# Patient Record
Sex: MALE | Race: WHITE
[De-identification: names, ages, dates, MRNs, and addresses within clinical notes are randomized per-mention and may not be internally consistent; named-entity substitution may affect disease eponyms.]

---

## 2018-03-05 ENCOUNTER — HOSPITAL ENCOUNTER (EMERGENCY)
Dept: HOSPITAL 45 - C.EDB | Age: 58
Discharge: HOME | End: 2018-03-05
Payer: COMMERCIAL

## 2018-03-05 VITALS
HEIGHT: 75 IN | BODY MASS INDEX: 39.17 KG/M2 | BODY MASS INDEX: 39.17 KG/M2 | WEIGHT: 315 LBS | HEIGHT: 75 IN | WEIGHT: 315 LBS

## 2018-03-05 VITALS — HEART RATE: 63 BPM | DIASTOLIC BLOOD PRESSURE: 90 MMHG | SYSTOLIC BLOOD PRESSURE: 170 MMHG | OXYGEN SATURATION: 96 %

## 2018-03-05 VITALS — TEMPERATURE: 98.42 F

## 2018-03-05 DIAGNOSIS — I10: ICD-10-CM

## 2018-03-05 DIAGNOSIS — J45.909: ICD-10-CM

## 2018-03-05 DIAGNOSIS — S30.0XXA: ICD-10-CM

## 2018-03-05 DIAGNOSIS — S22.32XA: Primary | ICD-10-CM
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
## 2018-03-05 LAB
ALBUMIN SERPL-MCNC: 4 GM/DL (ref 3.4–5)
ALP SERPL-CCNC: 79 U/L (ref 45–117)
ALT SERPL-CCNC: 33 U/L (ref 12–78)
AST SERPL-CCNC: 18 U/L (ref 15–37)
BASOPHILS # BLD: 0.06 K/UL (ref 0–0.2)
BASOPHILS NFR BLD: 0.5 %
BUN SERPL-MCNC: 17 MG/DL (ref 7–18)
CALCIUM SERPL-MCNC: 8.7 MG/DL (ref 8.5–10.1)
CO2 SERPL-SCNC: 24 MMOL/L (ref 21–32)
CREAT SERPL-MCNC: 0.79 MG/DL (ref 0.6–1.4)
EOS ABS #: 0.04 K/UL (ref 0–0.5)
EOSINOPHIL NFR BLD AUTO: 217 K/UL (ref 130–400)
GLUCOSE SERPL-MCNC: 118 MG/DL (ref 70–99)
HCT VFR BLD CALC: 43.5 % (ref 42–52)
HGB BLD-MCNC: 15.2 G/DL (ref 14–18)
IG#: 0.07 K/UL (ref 0–0.02)
IMM GRANULOCYTES NFR BLD AUTO: 12.6 %
LYMPHOCYTES # BLD: 1.38 K/UL (ref 1.2–3.4)
MCH RBC QN AUTO: 31.5 PG (ref 25–34)
MCHC RBC AUTO-ENTMCNC: 34.9 G/DL (ref 32–36)
MCV RBC AUTO: 90.1 FL (ref 80–100)
MONO ABS #: 0.62 K/UL (ref 0.11–0.59)
MONOCYTES NFR BLD: 5.7 %
NEUT ABS #: 8.77 K/UL (ref 1.4–6.5)
NEUTROPHILS # BLD AUTO: 0.4 %
NEUTROPHILS NFR BLD AUTO: 80.2 %
PMV BLD AUTO: 9.4 FL (ref 7.4–10.4)
POTASSIUM SERPL-SCNC: 3.7 MMOL/L (ref 3.5–5.1)
PROT SERPL-MCNC: 7.5 GM/DL (ref 6.4–8.2)
RED CELL DISTRIBUTION WIDTH CV: 13.2 % (ref 11.5–14.5)
RED CELL DISTRIBUTION WIDTH SD: 43.4 FL (ref 36.4–46.3)
SODIUM SERPL-SCNC: 135 MMOL/L (ref 136–145)
WBC # BLD AUTO: 10.94 K/UL (ref 4.8–10.8)

## 2018-03-05 NOTE — EMERGENCY ROOM VISIT NOTE
History


First contact with patient:  16:26


Chief Complaint:  FALL


Stated Complaint:  FALL, BACK PAIN -WORKERS COMP





History of Present Illness


The patient is a 58 year old male who presents to the Emergency Room via ALS 

with complaints of severe back pain s/p fall.  At approximately 10:00 this 

morning, the patient was attempting to get out of his truck.  He states he 

missed a step, and fell backwards, attempting to turn toward the side, but 

unsuccessfully.  He states when he landed, he landed flat on his back in a 

supine position.  He does admit to striking his head and losing consciousness.  

The patient states when he awoke, he was feeling very disoriented and having 

some difficulty breathing.  He attempted to get back in his truck without 

success.  His Worker's Compensation providers recommended he go to urgent care 

to be evaluated.  He contacted urgent care, and was advised to come straight to 

the emergency department due to his injuries.  The patient is unable to sit or 

lie down due to severe pain in his mid back.  He denies any paresthesias or 

weakness.  He was given 15 mg total morphine prior to arrival with mild 

improvement in pain.  He does describe some spasms throughout his mid to low 

back, and states when this occurs, it is very difficult for him to breathe.  He 

denies any cough or coughing up sputum or blood.  He denies any significant 

chest pain.  He does have tenderness of the neck, which shoots pain down into 

his lower back.  He denies any confusion, dizziness, visual disturbances, or 

balance abnormalities.  The patient denies previous injury to his back, but 

does have a history of degenerative joint disease, and states "my bottom disc 

is crushed".





Review of Systems


A complete 10 point review of systems was reviewed with the patient with 

pertinent positives and negatives as per history of present illness. All else 

were negative.





Past Medical/Surgical History


Medical Problems:


(1) Asthma


(2) HTN (hypertension)


(3) PNA (pneumonia)








Family History





Patient reports no known family medical history.





Social History


Smoking Status:  Never Smoker


Alcohol Use:  occasionally


Marital Status:  single


Housing Status:  lives alone


Occupation Status:  employed





Current/Historical Medications


Scheduled


Epinephrine (Epipen), 0.3 MG IM UD


Hydrochlorothiazide (Hctz), 12.5 MG PO DAILY


Mometasone Furoate (Inhalation (Asmanex Hfa), 2 PUFFS PO BID





Scheduled PRN


Albuterol Inhaler (Ventolin Inhaler), 2 PUFFS INH QID PRN for Asthma


Baclofen (Lioresal), 10 MG PO TID PRN for Muscle Spasms


Lidocaine (Lidocaine), 1 PATCH TD QD PRN for Pain


Oxycodone Ir (Roxicodone Ir), 1-2 TAB PO Q4H PRN for Pain





Physical Exam


Vital Signs











  Date Time  Temp Pulse Resp B/P (MAP) Pulse Ox O2 Delivery O2 Flow Rate FiO2


 


3/5/18 20:39  63 18 170/90 96   


 


3/5/18 18:22  67 18 151/88 94 Room Air  


 


3/5/18 16:33 36.9 78 18 199/118 96 Room Air  











Physical Exam


VITALS: Vitals are noted on the nurse's note and reviewed by myself.  Vital 

signs stable.


GENERAL: This is a 58-year-old obese white male, in no acute distress, 

nondiaphoretic, well-developed well-nourished.


SKIN: The skin was without rashes, erythema, edema, or bruising.  There is no 

tenting of the skin.  Capillary reflex less than 2 seconds.


HEAD: Normocephalic atraumatic.  


EARS: External auditory canals clear, tympanic membranes pearly gray without 

erythema or effusion bilaterally.


EYES: Pupils equal round and reactive to light and accommodation.  Conjunctivae 

without injection, sclerae without icterus.  Extraocular movements intact.  


NOSE: Patent, turbinates without inflammation or discharge.  No sinus 

tenderness.


MOUTH: Mucous membranes moist.  Tonsils are not enlarged.  Pharynx without 

erythema or exudate.  Uvula midline.  Airway patent.  Tongue does not deviate.  


NECK: Supple without nuchal rigidity.  No lymphadenopathy.  No thyromegaly.  

Cervical spine is nontender.  No JVD.


HEART: Regular rate and rhythm without murmurs gallops or rubs.


LUNGS: Clear to auscultation bilaterally without wheezes, rales or rhonchi.  No 

dullness to percussion.  No retractions or accessory muscle use.


ABDOMEN: Positive bowel sounds x 4.  Normal tympanic percussion.  Soft, 

nontender, without masses or organomegaly.


MUSCULOSKELETAL: No muscle atrophy, erythema, or edema noted.  Full range of 

motion without joint tenderness in all extremities.  Tenderness of the mid-low 

spinous processes and right paraspinous muscles in the lumbar region on 

palpation.  Slow gait.  The patient is having difficulty with any attempts to 

lie flat or lift his feet. Unable to perform straight-leg raise testing. 

Strength 5/5 throughout.


NEURO: Patient was alert and oriented to person place and time.  Normal 

sensation to light and sharp touch.  Deep tendon reflexes 2+ throughout.  No 

focal neurological deficits.





Medical Decision & Procedures


ER Provider


Diagnostic Interpretation:


HEAD WITHOUT CONTRAST (CT)





CT DOSE: 5214.04 mGy.cm





HISTORY: Trauma  head injury, 3.5 ft fall with LOC





TECHNIQUE: Multiaxial CT images of the head were performed without the use of


intravenous contrast.  A dose lowering technique was utilized adhering to the


principles of ALARA.








Comparison: None.





Findings: The paranasal sinuses and mastoid air cells are clear. The calvarium


and skull base are intact. The ventricles and sulci are within normal limits.


There is no mass, hematoma, midline shift, or acute infarct.





Impression:


No acute intracranial abnormality. 

















The above report was generated using voice recognition software.  It may contain


grammatical, syntax or spelling errors.











Electronically signed by:  Will Russ M.D.


3/5/2018 6:50 PM





Dictated Date/Time:  3/5/2018 6:49 PM








CERVICAL SPINE W/O








CT DOSE:    





HISTORY: Trauma  head injury, neck pain, 3.5 ft. fall





TECHNIQUE: Multiaxial CT images of the cervical spine were performed and


reformatted in the sagittal and coronal plane without the use of contrast.  A


dose lowering technique was utilized adhering to the principles of ALARA.





COMPARISON:  None.





FINDINGS: No fractures. No subluxation. Prevertebral soft tissues and the C1-C2


interval are intact. No pneumothorax.





IMPRESSION:  


No fractures within the cervical spine. Moderate degenerative change.











The above report was generated using voice recognition software.  It may contain


grammatical, syntax or spelling errors.











Electronically signed by:  Wlil Russ M.D.


3/5/2018 6:56 PM





Dictated Date/Time:  3/5/2018 6:51 PM








(CHEST) THORAX WITH





CT DOSE:    





HISTORY: Trauma  trauma, thoracic/lumbar back pain, dyspnea





TECHNIQUE: Multiaxial CT images of the chest were performed following the


intravenous administration of contrast.  A dose lowering technique was utilized


adhering to the principles of ALARA.








COMPARISON:  None.





FINDINGS: The lungs are clear. The mediastinal vascular structures are within


normal limits. No mediastinal or hilar lymphadenopathy. No pleural effusion or


pneumothorax. Limited views of the upper abdomen demonstrate a normal liver and


spleen. Nondisplaced cortical fracture posterior left sixth rib. No evidence for


compression deformity.





IMPRESSION: 


No significant abnormality identified within the chest. Nondisplaced cortical


fracture left posterior sixth rib. Moderate degenerative change throughout the


thoracic region with no evidence for compression deformity.














The above report was generated using voice recognition software.  It may contain


grammatical, syntax or spelling errors.











Electronically signed by:  Will Russ M.D.


3/5/2018 7:02 PM





Dictated Date/Time:  3/5/2018 6:58 PM








ABD/PELVIS IV CONTRAST ONLY





CT DOSE:    





HISTORY: Trauma. Pain.  trauma, thoracic/lumbar back pain





TECHNIQUE: Multiaxial CT images of the abdomen and pelvis were performed


following the use of intravenous contrast.  A dose lowering technique was


utilized adhering to the principles of ALARA.








COMPARISON STUDY: None.





FINDINGS: The lung bases are clear. The liver, spleen, gallbladder, pancreas,


kidneys, and adrenal glands are within normal limits. No bowel wall thickening


or obstruction. The pelvic organs are unremarkable. No suspicious lytic or


blastic osseous lesions.





IMPRESSION: 


No significant abnormality identified within the abdomen or pelvis. Moderate


degenerative changes of the low lumbar region with no acute bony abnormality


identified.











The above report was generated using voice recognition software.  It may contain


grammatical, syntax or spelling errors.











Electronically signed by:  Will Russ M.D.


3/5/2018 7:06 PM





Dictated Date/Time:  3/5/2018 7:04 PM





Laboratory Results


3/5/18 17:34








Red Blood Count 4.83, Mean Corpuscular Volume 90.1, Mean Corpuscular Hemoglobin 

31.5, Mean Corpuscular Hemoglobin Concent 34.9, Mean Platelet Volume 9.4, 

Neutrophils (%) (Auto) 80.2, Lymphocytes (%) (Auto) 12.6, Monocytes (%) (Auto) 

5.7, Eosinophils (%) (Auto) 0.4, Basophils (%) (Auto) 0.5, Neutrophils # (Auto) 

8.77, Lymphocytes # (Auto) 1.38, Monocytes # (Auto) 0.62, Eosinophils # (Auto) 

0.04, Basophils # (Auto) 0.06





3/5/18 17:34

















Test


  3/5/18


17:34


 


White Blood Count


  10.94 K/uL


(4.8-10.8)


 


Red Blood Count


  4.83 M/uL


(4.7-6.1)


 


Hemoglobin


  15.2 g/dL


(14.0-18.0)


 


Hematocrit 43.5 % (42-52) 


 


Mean Corpuscular Volume


  90.1 fL


()


 


Mean Corpuscular Hemoglobin


  31.5 pg


(25-34)


 


Mean Corpuscular Hemoglobin


Concent 34.9 g/dl


(32-36)


 


Platelet Count


  217 K/uL


(130-400)


 


Mean Platelet Volume


  9.4 fL


(7.4-10.4)


 


Neutrophils (%) (Auto) 80.2 % 


 


Lymphocytes (%) (Auto) 12.6 % 


 


Monocytes (%) (Auto) 5.7 % 


 


Eosinophils (%) (Auto) 0.4 % 


 


Basophils (%) (Auto) 0.5 % 


 


Neutrophils # (Auto)


  8.77 K/uL


(1.4-6.5)


 


Lymphocytes # (Auto)


  1.38 K/uL


(1.2-3.4)


 


Monocytes # (Auto)


  0.62 K/uL


(0.11-0.59)


 


Eosinophils # (Auto)


  0.04 K/uL


(0-0.5)


 


Basophils # (Auto)


  0.06 K/uL


(0-0.2)


 


RDW Standard Deviation


  43.4 fL


(36.4-46.3)


 


RDW Coefficient of Variation


  13.2 %


(11.5-14.5)


 


Immature Granulocyte % (Auto) 0.6 % 


 


Immature Granulocyte # (Auto)


  0.07 K/uL


(0.00-0.02)


 


Anion Gap


  8.0 mmol/L


(3-11)


 


Est Creatinine Clear Calc


Drug Dose 157.0 ml/min 


 


 


Estimated GFR (


American) 113.1 


 


 


Estimated GFR (Non-


American 97.6 


 


 


BUN/Creatinine Ratio 21.9 (10-20) 


 


Calcium Level


  8.7 mg/dl


(8.5-10.1)


 


Total Bilirubin


  1.6 mg/dl


(0.2-1)


 


Aspartate Amino Transf


(AST/SGOT) 18 U/L (15-37) 


 


 


Alanine Aminotransferase


(ALT/SGPT) 33 U/L (12-78) 


 


 


Alkaline Phosphatase


  79 U/L


()


 


Total Protein


  7.5 gm/dl


(6.4-8.2)


 


Albumin


  4.0 gm/dl


(3.4-5.0)


 


Globulin


  3.5 gm/dl


(2.5-4.0)


 


Albumin/Globulin Ratio 1.1 (0.9-2) 











Medications Administered











 Medications


  (Trade)  Dose


 Ordered  Sig/Patt


 Route  Start Time


 Stop Time Status Last Admin


Dose Admin


 


 Hydromorphone HCl


  (Dilaudid Inj)  1 mg  NOW  STAT


 IV  3/5/18 16:49


 3/5/18 16:50 DC 3/5/18 18:30


1 MG


 


 Ondansetron HCl


  (Zofran Inj)  4 mg  NOW  STAT


 IV  3/5/18 16:49


 3/5/18 16:50 DC 3/5/18 18:18


4 MG


 


 Ondansetron HCl


  (Zofran Inj)  4 mg  NOW  STAT


 IV  3/5/18 19:03


 3/5/18 19:04 DC 3/5/18 19:03


4 MG


 


 Diazepam


  (Valium Inj)  5 mg  NOW  STAT


 IV  3/5/18 19:20


 3/5/18 19:22 DC 3/5/18 20:05


5 MG


 


 Lidocaine


  (Lidoderm Patch


 5%)  1 patch  NOW  STAT


 TD  3/5/18 20:05


 3/5/18 20:06 DC 3/5/18 20:28


1 PATCH


 


 Oxycodone HCl


  (Roxicodone


 Immediate Rel 5MG


 Home Pack)  1 homepack  UD  STAT


 PO  3/5/18 20:09


 3/5/18 20:10 DC 3/5/18 20:28


1 HOMEPACK


 


 Ondansetron HCl


  (Zofran Inj)  4 mg  NOW  STAT


 IV  3/5/18 20:09


 3/5/18 20:10 DC 3/5/18 20:28


4 MG


 


 Ondansetron HCl


  (ZOFRAN ODT 4MG


 Home Pack)  1 homepack  UD  STAT


 PO  3/5/18 20:09


 3/5/18 20:10 DC 3/5/18 20:28


1 HOMEPACK











ED Course


The patient was seen and evaluated as above.





Labs obtained.  CT scans ordered.  Order for Zofran and Dilaudid placed.  The 

patient would like to wait for CT scan to get the Dilaudid.





The patient was given Dilaudid and CT scans performed.





The patient complains of increased nausea.  He was given 4 mg Zofran IV.





The patient was reassessed.  I did discuss the findings with the patient at 

bedside.  He states he does not feel that he can go home safely due to 

significant pain and inability to get into a vehicle.  I discussed with him 

that recommendation would be for discharge with pain medication, muscle relaxers

, and close follow-up with the primary care provider.  The patient does request 

discussing his case with the admitting team.





I discussed the case with the [facility] hospitalist.  They recommended 

discharge with pain medication, as they would not be willing to medicate the 

patient with narcotics all night.





I discussed this recommendation with the patient at bedside.  He is agreeable.  

He is given 5 mg Valium and a repeat dose of 4 mg Zofran 4 nausea and muscle 

spasms.





Discharge instructions reviewed, the patient was discharged home in good 

condition.





Medical Decision


This is a 58-year-old male who presents to the emergency department via ALS 

approximately 6 hours after 3-1/2 foot fall landing on his back with head 

injury and loss of consciousness.  The patient is having severe right-sided 

back pain since the fall.  He was given 50 mg morphine prior to arrival.  Labs 

were reviewed and non-concerning.  Imaging performed and significant for only 

left posterior nondisplaced sixth rib fracture.  The patient was given 1 mg 

Dilaudid and several doses of Zofran while here in the emergency department.  

The muscle spasms seem to be more concerning for the patient, and he was given 

Valium to help.  I did discuss the case with the hospitalist at the patient's 

request due to intractable pain.  The hospitalist recommendation was for a 

lidocaine patch and anti-inflammatory medications with muscle relaxers.  I 

discussed this with patient at bedside.  He was agreeable to this treatment 

plan.  I discussed proper outpatient management and encouraged him to follow-up 

closely with his primary care provider and/or Worker's Compensation provider.  

All questions were answered to patient's satisfaction.





Etiologies such as fracture, dislocation, soft tissue injury, intra-abdominal, 

intrathoracic, intracranial as well as other traumatic pathologies were 

entertained.





Medication Reconcilliation


Current Medication List:  was personally reviewed by me





Blood Pressure Screening


Patient's blood pressure:  Elevated blood pressure


Blood pressure disposition:  Elevated BP felt to be situational





Impression





 Primary Impression:  


 Left rib fracture


 Additional Impressions:  


 Contusion of lower back


 Fall





Departure Information


Dispostion


Home / Self-Care





Condition


GOOD





Prescriptions





Lidocaine (LIDOCAINE) 5 % Pad


1 PATCH TD QD Y for Pain, #1 BOX


   Apply for 12 hours, then remove for 12 hours. Max 1 patch in 12 hours.


   Prov: Jewels Keane PA-C         3/5/18 


Oxycodone Ir (Roxicodone Ir) 5 Mg Tab


1-2 TAB PO Q4H Y for Pain, #15 TAB


   For Initial Treatment


   Prov: Jewels Keane PA-C         3/5/18 


Baclofen (Lioresal) 10 Mg Tab


10 MG PO TID Y for Muscle Spasms, #15 TAB


   Prov: Jewels Keane PA-C         3/5/18





Referrals


No Doctor, Assigned (PCP)





Patient Instructions


ED Fx Rib, ED Low Back Pain Injury, ED Mechanical Fall, [facility]





Additional Instructions





You have been treated in the Emergency Department for posterior left sixth rib 

fracture.  You have received pain medicine in the emergency department which 

impairs your ability to operate a vehicle. It is illegal for you to drive after 

receiving these medicines. 





You have been prescribed OxyIR to be used for pain control. This is a narcotic 

medication. You cannot drive or consume alcohol while on this medicine. This 

medicine should only be used for pain that cannot be controlled with over-the-

counter pain medicines.





You have been prescribed baclofen 1 tabs orally, three times per day. Do NOT 

exceed 30 mg (3 tabs) per day. Take your first dose at bedtime as it can make 

you drowsy. Always take all medications as prescribed.





You have been prescribed Lidoderm patches.  Use these patches as directed.  He 

may use only 1 patch per 24 hours, with 12 hours in between patches.





For pain control, you can use the following over-the-counter medicines (if >13 yo):


Ibuprofen(Motrin, Advil) may be used for fever or pain.  Use 600mg every six 

hours as needed.  Take with food.  Avoid using more than 2400mg in a 24 hour 

period.  Do not use 2400mg per day for more than three consecutive days without 

physician direction.  Prolonged inappropriate use can lead to stomach upset or 

ulcers. 


(AND/OR)


Acetaminophen(Tylenol) may be used for fever or pain.  Use 1000mg every six 

hours as needed.  Avoid using more than 3000mg in a 24 hour period.  





If this is an acute injury, ice can be applied to the area of pain for the 

first 3 days to help decrease pain and inflammation. After the first 3 days, a 

heating pad can be used over the area for continued soothing relief.





To minimize your discomfort, you can hug a pillow while coughing or sneezing.





Additionally, you should continue to force yourself to take nice, deep breaths. 

Full expansion of the lungs is necessary to prevent the accumulation of fluid 

in the lung tissue and development of pneumonia.





You should schedule a follow-up appointment in 2-3 days with your Primary Care 

Provider for further evaluation and treatment of your back pain.





Return to the Emergency Department if your current symptoms worsen despite 

treatment course outlined above, or if you develop any of the following symptoms

: intractable pain despite aforementioned treatment course, development of a 

wet cough, bloody cough, fever, chills, or increased shortness of breath.





Work Instructions


Return To Work:  1 week





Problem Qualifiers








 Primary Impression:  


 Left rib fracture


 Encounter type:  initial encounter  Rib fracture type:  single rib  Fracture 

type:  closed  Qualified Codes:  S22.32XA - Fracture of one rib, left side, 

initial encounter for closed fracture


 Additional Impressions:  


 Contusion of lower back


 Encounter type:  initial encounter  Qualified Codes:  S30.0XXA - Contusion of 

lower back and pelvis, initial encounter


 Fall


 Encounter type:  initial encounter  Qualified Codes:  W19.XXXA - Unspecified 

fall, initial encounter

## 2018-03-05 NOTE — DIAGNOSTIC IMAGING REPORT
CERVICAL SPINE W/O





CT DOSE:    



HISTORY: Trauma  head injury, neck pain, 3.5 ft. fall



TECHNIQUE: Multiaxial CT images of the cervical spine were performed and

reformatted in the sagittal and coronal plane without the use of contrast.  A

dose lowering technique was utilized adhering to the principles of ALARA.



COMPARISON:  None.



FINDINGS: No fractures. No subluxation. Prevertebral soft tissues and the C1-C2

interval are intact. No pneumothorax.



IMPRESSION:  

No fractures within the cervical spine. Moderate degenerative change.







The above report was generated using voice recognition software.  It may contain

grammatical, syntax or spelling errors.







Electronically signed by:  Will Russ M.D.

3/5/2018 6:56 PM



Dictated Date/Time:  3/5/2018 6:51 PM

## 2018-03-05 NOTE — DIAGNOSTIC IMAGING REPORT
(CHEST) THORAX WITH



CT DOSE:    



HISTORY: Trauma  trauma, thoracic/lumbar back pain, dyspnea



TECHNIQUE: Multiaxial CT images of the chest were performed following the

intravenous administration of contrast.  A dose lowering technique was utilized

adhering to the principles of ALARA.





COMPARISON:  None.



FINDINGS: The lungs are clear. The mediastinal vascular structures are within

normal limits. No mediastinal or hilar lymphadenopathy. No pleural effusion or

pneumothorax. Limited views of the upper abdomen demonstrate a normal liver and

spleen. Nondisplaced cortical fracture posterior left sixth rib. No evidence for

compression deformity.



IMPRESSION: 

No significant abnormality identified within the chest. Nondisplaced cortical

fracture left posterior sixth rib. Moderate degenerative change throughout the

thoracic region with no evidence for compression deformity.









The above report was generated using voice recognition software.  It may contain

grammatical, syntax or spelling errors.







Electronically signed by:  Will Russ M.D.

3/5/2018 7:02 PM



Dictated Date/Time:  3/5/2018 6:58 PM

## 2018-03-05 NOTE — DIAGNOSTIC IMAGING REPORT
HEAD WITHOUT CONTRAST (CT)



CT DOSE: 5214.04 mGy.cm



HISTORY: Trauma  head injury, 3.5 ft fall with LOC



TECHNIQUE: Multiaxial CT images of the head were performed without the use of

intravenous contrast.  A dose lowering technique was utilized adhering to the

principles of ALARA.





Comparison: None.



Findings: The paranasal sinuses and mastoid air cells are clear. The calvarium

and skull base are intact. The ventricles and sulci are within normal limits.

There is no mass, hematoma, midline shift, or acute infarct.



Impression:

No acute intracranial abnormality. 











The above report was generated using voice recognition software.  It may contain

grammatical, syntax or spelling errors.







Electronically signed by:  Will Russ M.D.

3/5/2018 6:50 PM



Dictated Date/Time:  3/5/2018 6:49 PM

## 2018-03-05 NOTE — DIAGNOSTIC IMAGING REPORT
ABD/PELVIS IV CONTRAST ONLY



CT DOSE:    



HISTORY: Trauma. Pain.  trauma, thoracic/lumbar back pain



TECHNIQUE: Multiaxial CT images of the abdomen and pelvis were performed

following the use of intravenous contrast.  A dose lowering technique was

utilized adhering to the principles of ALARA.





COMPARISON STUDY: None.



FINDINGS: The lung bases are clear. The liver, spleen, gallbladder, pancreas,

kidneys, and adrenal glands are within normal limits. No bowel wall thickening

or obstruction. The pelvic organs are unremarkable. No suspicious lytic or

blastic osseous lesions.



IMPRESSION: 

No significant abnormality identified within the abdomen or pelvis. Moderate

degenerative changes of the low lumbar region with no acute bony abnormality

identified.







The above report was generated using voice recognition software.  It may contain

grammatical, syntax or spelling errors.







Electronically signed by:  Will Russ M.D.

3/5/2018 7:06 PM



Dictated Date/Time:  3/5/2018 7:04 PM

## 2018-03-07 ENCOUNTER — HOSPITAL ENCOUNTER (EMERGENCY)
Dept: HOSPITAL 45 - C.EDB | Age: 58
Discharge: HOME | End: 2018-03-07
Payer: COMMERCIAL

## 2018-03-07 VITALS
HEIGHT: 75 IN | HEIGHT: 75 IN | WEIGHT: 315 LBS | BODY MASS INDEX: 39.17 KG/M2 | WEIGHT: 315 LBS | BODY MASS INDEX: 39.17 KG/M2

## 2018-03-07 VITALS — OXYGEN SATURATION: 96 % | HEART RATE: 76 BPM | SYSTOLIC BLOOD PRESSURE: 186 MMHG | DIASTOLIC BLOOD PRESSURE: 104 MMHG

## 2018-03-07 VITALS — TEMPERATURE: 97.34 F
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
## 2018-03-07 DIAGNOSIS — I10: ICD-10-CM

## 2018-03-07 DIAGNOSIS — T78.40XA: Primary | ICD-10-CM

## 2018-03-07 DIAGNOSIS — Z79.899: ICD-10-CM

## 2018-03-07 DIAGNOSIS — J45.909: ICD-10-CM

## 2018-03-07 DIAGNOSIS — Z87.01: ICD-10-CM

## 2018-03-07 LAB
BASOPHILS # BLD: 0.02 K/UL (ref 0–0.2)
BASOPHILS NFR BLD: 0.3 %
BUN SERPL-MCNC: 19 MG/DL (ref 7–18)
CALCIUM SERPL-MCNC: 8.7 MG/DL (ref 8.5–10.1)
CO2 SERPL-SCNC: 31 MMOL/L (ref 21–32)
CREAT SERPL-MCNC: 0.85 MG/DL (ref 0.6–1.4)
EOS ABS #: 0.07 K/UL (ref 0–0.5)
EOSINOPHIL NFR BLD AUTO: 195 K/UL (ref 130–400)
GLUCOSE SERPL-MCNC: 99 MG/DL (ref 70–99)
HCT VFR BLD CALC: 44.8 % (ref 42–52)
HGB BLD-MCNC: 15.1 G/DL (ref 14–18)
IG#: 0.04 K/UL (ref 0–0.02)
IMM GRANULOCYTES NFR BLD AUTO: 13.7 %
LYMPHOCYTES # BLD: 0.98 K/UL (ref 1.2–3.4)
MCH RBC QN AUTO: 31.7 PG (ref 25–34)
MCHC RBC AUTO-ENTMCNC: 33.7 G/DL (ref 32–36)
MCV RBC AUTO: 93.9 FL (ref 80–100)
MONO ABS #: 0.58 K/UL (ref 0.11–0.59)
MONOCYTES NFR BLD: 8.1 %
NEUT ABS #: 5.44 K/UL (ref 1.4–6.5)
NEUTROPHILS # BLD AUTO: 1 %
NEUTROPHILS NFR BLD AUTO: 76.3 %
PMV BLD AUTO: 9.7 FL (ref 7.4–10.4)
POTASSIUM SERPL-SCNC: 4.1 MMOL/L (ref 3.5–5.1)
RED CELL DISTRIBUTION WIDTH CV: 13.6 % (ref 11.5–14.5)
RED CELL DISTRIBUTION WIDTH SD: 46.8 FL (ref 36.4–46.3)
SODIUM SERPL-SCNC: 137 MMOL/L (ref 136–145)
WBC # BLD AUTO: 7.13 K/UL (ref 4.8–10.8)

## 2018-03-07 NOTE — EMERGENCY ROOM VISIT NOTE
ED Visit Note


First contact with patient:  13:06


The patient was seen and examined with Devi Dela Cruz PA-C.  I agree with the 

history, physical and findings.  Please see the note for disposition and 

details.  Rash resolved with treatment.  Patient will stop oxycodone.  He will 

start oral Dilaudid as needed for pain.  Prednisone prescribed.  He has a PCP 

follow-up this week.

## 2018-03-07 NOTE — EMERGENCY ROOM VISIT NOTE
History


First contact with patient:  13:06


Chief Complaint:  ALLERGIC REACTION


Stated Complaint:  ALLERGIC REACTION TO MEDICINE


Nursing Triage Summary:  


Pt refuses to sit in weigh chair, states broken ribs. Pt states, "You guys gave 


me oxycodone Mon night. This is my voice now. I called here and they told me to 


come right in." Trouble swallowing and sob.





History of Present Illness


The patient is a 58 year old male who presents to the Emergency Room with 

complaints of an allergic reaction that started last night.  Patient was seen 

here 2 days ago for a fall.  He was diagnosed with a rib fracture.  He was 

prescribed oxycodone for pain.  He took one last night.  Soon after, he 

developed itching.  Now he notes a scratchy voice.  He reports difficulty 

swallowing.  He is also feeling slightly short of breath.  He has also taken 

ibuprofen.  He denies any previous reaction to ibuprofen.  This is the first 

time he is taking oxycodone.  He is still experiencing "excruciating rib pain."





Review of Systems


10 system review performed and  negative unless noted in HPI or below





Past Medical/Surgical History


Medical Problems:


(1) Asthma


(2) HTN (hypertension)


(3) PNA (pneumonia)








Family History





Patient reports no known family medical history.





Social History


Smoking Status:  Never Smoker


Alcohol Use:  occasionally


Marital Status:  single


Housing Status:  lives alone


Occupation Status:  employed





Current/Historical Medications


Scheduled


Epinephrine (Epipen), 0.3 MG IM UD


Hydrochlorothiazide (Hctz), 12.5 MG PO DAILY


Mometasone Furoate (Inhalation (Asmanex Hfa), 2 PUFFS PO BID


Prednisone (Prednisone), 50 MG PO DAILY


Ranitidine (Zantac), 150 MG PO BID





Scheduled PRN


Albuterol Hfa (Ventolin Hfa), 2 PUFFS INH QID PRN for asthma


Baclofen (Lioresal), 10 MG PO TID PRN for Muscle Spasms


Hydromorphone Hcl (Dilaudid), 1 TAB PO QID PRN for Pain


Lidocaine (Lidocaine), 1 PATCH TD QD PRN for Pain


Oxycodone Ir (Roxicodone Ir), 1-2 TAB PO Q4H PRN for Pain





Physical Exam


Vital Signs











  Date Time  Temp Pulse Resp B/P (MAP) Pulse Ox O2 Delivery O2 Flow Rate FiO2


 


3/7/18 16:05  76 20 186/104 96   


 


3/7/18 14:34    173/96    


 


3/7/18 14:25  71   98   


 


3/7/18 13:55  65   99   


 


3/7/18 13:25  75 14  96   


 


3/7/18 13:18  74      


 


3/7/18 13:00 36.3 81 18 196/104 97 Room Air  


 


3/7/18 12:58     97 Room Air  











Physical Exam


GENERAL: 58-year-old male, disgruntled, not cooperative with exam, in no acute 

distress,


SKIN: Erythematous, blanching rash noted to the anterior and lateral side of 

the left chest


HEAD: Normocephalic atraumatic.  


MOUTH: Mucous membranes moist.  Tonsils are not enlarged.  Pharynx without 

erythema or exudate.  Uvula midline.  Airway patent.  Tongue does not deviate.  


NECK: Supple without nuchal rigidity.  No lymphadenopathy. Cervical spine is 

nontender.  No JVD.


HEART: Regular rate and rhythm without murmurs gallops or rubs.


LUNGS: Clear to auscultation bilaterally without wheezes, rales or rhonchi.   

No accessory muscle use.


MUSCULOSKELETAL:  Strength 5/5 throughout.


NEURO: Patient was alert and oriented to person place and time.  Normal 

sensation to touch. No focal neurological deficits.





Medical Decision & Procedures


Laboratory Results


3/7/18 13:15








Red Blood Count 4.77, Mean Corpuscular Volume 93.9, Mean Corpuscular Hemoglobin 

31.7, Mean Corpuscular Hemoglobin Concent 33.7, Mean Platelet Volume 9.7, 

Neutrophils (%) (Auto) 76.3, Lymphocytes (%) (Auto) 13.7, Monocytes (%) (Auto) 

8.1, Eosinophils (%) (Auto) 1.0, Basophils (%) (Auto) 0.3, Neutrophils # (Auto) 

5.44, Lymphocytes # (Auto) 0.98, Monocytes # (Auto) 0.58, Eosinophils # (Auto) 

0.07, Basophils # (Auto) 0.02





3/7/18 13:15

















Test


  3/7/18


13:15


 


White Blood Count


  7.13 K/uL


(4.8-10.8)


 


Red Blood Count


  4.77 M/uL


(4.7-6.1)


 


Hemoglobin


  15.1 g/dL


(14.0-18.0)


 


Hematocrit 44.8 % (42-52) 


 


Mean Corpuscular Volume


  93.9 fL


()


 


Mean Corpuscular Hemoglobin


  31.7 pg


(25-34)


 


Mean Corpuscular Hemoglobin


Concent 33.7 g/dl


(32-36)


 


Platelet Count


  195 K/uL


(130-400)


 


Mean Platelet Volume


  9.7 fL


(7.4-10.4)


 


Neutrophils (%) (Auto) 76.3 % 


 


Lymphocytes (%) (Auto) 13.7 % 


 


Monocytes (%) (Auto) 8.1 % 


 


Eosinophils (%) (Auto) 1.0 % 


 


Basophils (%) (Auto) 0.3 % 


 


Neutrophils # (Auto)


  5.44 K/uL


(1.4-6.5)


 


Lymphocytes # (Auto)


  0.98 K/uL


(1.2-3.4)


 


Monocytes # (Auto)


  0.58 K/uL


(0.11-0.59)


 


Eosinophils # (Auto)


  0.07 K/uL


(0-0.5)


 


Basophils # (Auto)


  0.02 K/uL


(0-0.2)


 


RDW Standard Deviation


  46.8 fL


(36.4-46.3)


 


RDW Coefficient of Variation


  13.6 %


(11.5-14.5)


 


Immature Granulocyte % (Auto) 0.6 % 


 


Immature Granulocyte # (Auto)


  0.04 K/uL


(0.00-0.02)


 


Anion Gap


  2.0 mmol/L


(3-11)


 


Est Creatinine Clear Calc


Drug Dose 145.8 ml/min 


 


 


Estimated GFR (


American) 109.8 


 


 


Estimated GFR (Non-


American 94.7 


 


 


BUN/Creatinine Ratio 22.5 (10-20) 


 


Calcium Level


  8.7 mg/dl


(8.5-10.1)











Medications Administered











 Medications


  (Trade)  Dose


 Ordered  Sig/Patt


 Route  Start Time


 Stop Time Status Last Admin


Dose Admin


 


 Diphenhydramine


 HCl


  (Benadryl Inj)  50 mg  NOW  STAT


 IV  3/7/18 13:19


 3/7/18 13:21 DC 3/7/18 13:27


50 MG


 


 Ranitidine HCl


  (zANTac IV)  50 mg  NOW  STAT


 IV  3/7/18 13:19


 3/7/18 13:21 DC 3/7/18 13:40


50 MG


 


 Methylprednisolone


 Sodium Succinate


  (Solu-Medrol IV)  125 mg  NOW  STAT


 IV  3/7/18 13:19


 3/7/18 13:21 DC 3/7/18 13:27


125 MG











ED Course


Patient was seen and examined


Vital signs including  blood pressure were reviewed


medications list was verified with patient


Labs were obtained, and a saline lock was established


The patient was medicated with Benadryl 50 mg, Zantac 50 mg and Solu-Medrol 125 

mg IV.


He was monitored for several hours


Upon reevaluation, the patient was feeling much better.  He denied any itching.

  No difficulty swallowing or breathing.  We thoroughly discussed further 

treatment options.  He voiced understanding.


I reviewed discharge instructions the patient.  They voiced understanding and 

had no further questions.





Medical Decision


Differential diagnosis: Allergic reaction, angioedema, adverse reaction, 

anaphylaxis





This patient is a 58-year-old male that presents to the emergency department 

complaining of itching and difficulty breathing after taking oxycodone last 

night.  On exam, he had hives to his chest.  His airway was patent.  There is 

no signs of angioedema.  No signs of anaphylaxis.  The patient had good 

symptomatic relief in the emergency department with the treatment as noted 

above.  I believe he is stable to be discharged home.  He was instructed to not 

take any further oxycodone.  He was given a short prescription for Dilaudid by 

mouth.  The patient was also instructed to take Benadryl 50 mg every 8 hours 

for 2 more doses.  He was also sent home with a prescription for Zantac and 

prednisone.  The patient was comfortable with this plan, and agrees to return 

with any worsening symptoms.





This chart was completed in part utilizing Dragon Speech Voice Recognition 

software. Attempts were made to minimize the grammatical errors, random word 

insertions, pronoun errors and incomplete sentences. Any formal questions or 

concerns about the content, text or information contained within the body of 

this dictation should be directly addressed to the provider for clarification.





Medication Reconcilliation


Current Medication List:  was personally reviewed by me





Blood Pressure Screening


Patient's blood pressure:  Elevated blood pressure


Blood pressure disposition:  Referred to PCP





Impression





 Primary Impression:  


 Allergic reaction





Departure Information


Dispostion


Home / Self-Care





Condition


GOOD





Prescriptions





Hydromorphone Hcl (DILAUDID) 2 Mg Tab


1 TAB PO QID Y for Pain, #15 TAB


   For Initial Treatment


   Prov: Devi Dela Cruz PA-C         3/7/18 


Ranitidine (Zantac) 150 Mg Tab


150 MG PO BID for 4 Days, #8 TAB


   Prov: Devi Dela Cruz PA-C         3/7/18 


Prednisone (Prednisone) 50 Mg Tab


50 MG PO DAILY for 4 Days, #4 TAB


   Prov: Devi Dela Cruz PA-C         3/7/18





Referrals


No Doctor, Assigned (PCP)





Patient Instructions


My [facility]





Additional Instructions





You have been evaluated in the emergency department for an allergic reaction.  

Please discontinue oxycodone.





Please take Dilaudid 1 tab every 4-6 hours as needed for pain.  Do not drink 

alcohol or drive while taking this medication.  Please also take a stool 

softener daily while taking this medication as it can cause constipation.





Please take the entire course of prednisone





Please take the entire course of Zantac





Please take Benadryl 50 mg (2 tabs) every 8 hours for the next 24 hours 








please do not hesitate to return to the emergency department with any nuclear 

worsening or concerning symptoms; especially,  Difficulty swallowing, swelling 

of his face, lips or tongue, or difficulty breathing





Please follow-up with your primary care physician next week for recheck.





Work Instructions


Return To Work:  1 week

## 2018-03-16 ENCOUNTER — HOSPITAL ENCOUNTER (OUTPATIENT)
Dept: HOSPITAL 45 - C.CTS | Age: 58
Discharge: HOME | End: 2018-03-16
Attending: PHYSICIAN ASSISTANT
Payer: COMMERCIAL

## 2018-03-16 DIAGNOSIS — R40.20: ICD-10-CM

## 2018-03-16 DIAGNOSIS — G93.9: Primary | ICD-10-CM

## 2018-03-16 DIAGNOSIS — Z91.81: ICD-10-CM

## 2018-03-16 NOTE — DIAGNOSTIC IMAGING REPORT
HEAD COMBO



CLINICAL HISTORY: Loss of consciousness. Fall.    



COMPARISON STUDY:  Head CT March 5, 2018.



TECHNIQUE: Axial images of the head were obtained before and after intravenous

administration of 93 cc of Optiray 320 IV.



FINDINGS:  No acute intracranial hemorrhage, midline shift or mass effect is

present. Ventricular system is normal. Basilar cisterns are patent. There are no

extra axial collections. There are no findings to suggest acute dural sinus

thrombosis or acute territorial infarct. No intracranial mass or pathologic

enhancement is present. There is intracranial vascular calcification. There is

no calvarial fracture. Note is made of a 1.4 cm lucent lesion within the right

occipital bone shown on image 4. Visualized portions of the mastoid air cells

are clear. There is mild mucosal thickening within the maxillary sinuses. 



IMPRESSION:  



1. No acute intracranial findings.



2. No intracranial mass or pathologic enhancement.



3. No calvarial fracture.



4. 1.4 cm lucent lesion within the right occipital bone which is nonspecific but

likely benign.









Electronically signed by:  Osei Martell M.D.

3/16/2018 8:46 AM



Dictated Date/Time:  3/16/2018 8:36 AM

## 2018-04-11 ENCOUNTER — HOSPITAL ENCOUNTER (OUTPATIENT)
Dept: HOSPITAL 45 - C.NUCL | Age: 58
Discharge: HOME | End: 2018-04-11
Attending: PHYSICIAN ASSISTANT
Payer: COMMERCIAL

## 2018-04-11 DIAGNOSIS — Q68.8: ICD-10-CM

## 2018-04-11 DIAGNOSIS — R94.02: ICD-10-CM

## 2018-04-11 DIAGNOSIS — M17.0: Primary | ICD-10-CM

## 2018-04-11 NOTE — DIAGNOSTIC IMAGING REPORT
BONE SCAN WHOLE BODY



CLINICAL HISTORY: 58 years-old Male presenting with ABNORMAL CT, BONE ANOMALY,

attention R OCCIPITAL BONE. 



TECHNIQUE: Planar anterior and posterior imaging of the whole body was performed

3 hours following the intravenous administration of 25.898 mCi Tc-99m MDP.

Dedicated obliquities of the skull were obtained for a focused evaluation at the

site of clinical concern.



COMPARISON: CT head from 3/5/2018.



FINDINGS: 

Multifocal areas of greater tracer uptake at the lateral knees most consistent

with degenerative change. No photopenic region to suggest a surgical change of

arthroplasty. Focal radiotracer uptake also noted in the the right hindfoot and

at the right first toe, also likely degenerative change.



Focal radiotracer uptake noted at the right occipital bone evident on posterior

view. On prior CT this demonstrated a rim of osteolysis with internal sclerosis.



IMPRESSION:  

1.  Focal radiotracer uptake in the right is subtle bone at the site of clinical

concern. This is not consistent with a bone cyst. 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

an ossifying fibroma or other benign entity.



2.  Degenerative changes at the bilateral knees and right foot.







Electronically signed by:  Wilman Martinez M.D.

4/11/2018 2:55 PM



Dictated Date/Time:  4/11/2018 2:37 PM